# Patient Record
Sex: MALE | Race: OTHER | NOT HISPANIC OR LATINO
[De-identification: names, ages, dates, MRNs, and addresses within clinical notes are randomized per-mention and may not be internally consistent; named-entity substitution may affect disease eponyms.]

---

## 2018-06-08 ENCOUNTER — RESULT REVIEW (OUTPATIENT)
Age: 68
End: 2018-06-08

## 2020-07-23 ENCOUNTER — TRANSCRIPTION ENCOUNTER (OUTPATIENT)
Age: 70
End: 2020-07-23

## 2020-07-24 ENCOUNTER — TRANSCRIPTION ENCOUNTER (OUTPATIENT)
Age: 70
End: 2020-07-24

## 2020-07-24 ENCOUNTER — INPATIENT (INPATIENT)
Facility: HOSPITAL | Age: 70
LOS: 0 days | Discharge: HOME | DRG: 502 | End: 2020-07-24
Attending: ORTHOPAEDIC SURGERY | Admitting: ORTHOPAEDIC SURGERY
Payer: COMMERCIAL

## 2020-07-24 VITALS
TEMPERATURE: 97 F | SYSTOLIC BLOOD PRESSURE: 157 MMHG | HEART RATE: 62 BPM | DIASTOLIC BLOOD PRESSURE: 68 MMHG | OXYGEN SATURATION: 99 % | RESPIRATION RATE: 17 BRPM

## 2020-07-24 VITALS
SYSTOLIC BLOOD PRESSURE: 172 MMHG | OXYGEN SATURATION: 98 % | RESPIRATION RATE: 18 BRPM | HEART RATE: 90 BPM | TEMPERATURE: 98 F | DIASTOLIC BLOOD PRESSURE: 83 MMHG | WEIGHT: 162.92 LBS

## 2020-07-24 DIAGNOSIS — S86.019A STRAIN OF UNSPECIFIED ACHILLES TENDON, INITIAL ENCOUNTER: ICD-10-CM

## 2020-07-24 LAB
ALBUMIN SERPL ELPH-MCNC: 4.6 G/DL — SIGNIFICANT CHANGE UP (ref 3.3–5)
ALP SERPL-CCNC: 48 U/L — SIGNIFICANT CHANGE UP (ref 40–120)
ALT FLD-CCNC: 22 U/L — SIGNIFICANT CHANGE UP (ref 10–45)
ANION GAP SERPL CALC-SCNC: 12 MMOL/L — SIGNIFICANT CHANGE UP (ref 5–17)
APPEARANCE UR: CLEAR — SIGNIFICANT CHANGE UP
APTT BLD: 29.6 SEC — SIGNIFICANT CHANGE UP (ref 27.5–35.5)
AST SERPL-CCNC: 20 U/L — SIGNIFICANT CHANGE UP (ref 10–40)
BASOPHILS # BLD AUTO: 0.06 K/UL — SIGNIFICANT CHANGE UP (ref 0–0.2)
BASOPHILS NFR BLD AUTO: 1 % — SIGNIFICANT CHANGE UP (ref 0–2)
BILIRUB SERPL-MCNC: 0.5 MG/DL — SIGNIFICANT CHANGE UP (ref 0.2–1.2)
BILIRUB UR-MCNC: NEGATIVE — SIGNIFICANT CHANGE UP
BLD GP AB SCN SERPL QL: NEGATIVE — SIGNIFICANT CHANGE UP
BUN SERPL-MCNC: 18 MG/DL — SIGNIFICANT CHANGE UP (ref 7–23)
CALCIUM SERPL-MCNC: 10 MG/DL — SIGNIFICANT CHANGE UP (ref 8.4–10.5)
CHLORIDE SERPL-SCNC: 106 MMOL/L — SIGNIFICANT CHANGE UP (ref 96–108)
CO2 SERPL-SCNC: 27 MMOL/L — SIGNIFICANT CHANGE UP (ref 22–31)
COLOR SPEC: YELLOW — SIGNIFICANT CHANGE UP
CREAT SERPL-MCNC: 1.04 MG/DL — SIGNIFICANT CHANGE UP (ref 0.5–1.3)
DIFF PNL FLD: ABNORMAL
EOSINOPHIL # BLD AUTO: 0.04 K/UL — SIGNIFICANT CHANGE UP (ref 0–0.5)
EOSINOPHIL NFR BLD AUTO: 0.7 % — SIGNIFICANT CHANGE UP (ref 0–6)
GLUCOSE SERPL-MCNC: 105 MG/DL — HIGH (ref 70–99)
GLUCOSE UR QL: NEGATIVE — SIGNIFICANT CHANGE UP
HCT VFR BLD CALC: 46.6 % — SIGNIFICANT CHANGE UP (ref 39–50)
HGB BLD-MCNC: 15.1 G/DL — SIGNIFICANT CHANGE UP (ref 13–17)
IMM GRANULOCYTES NFR BLD AUTO: 0.5 % — SIGNIFICANT CHANGE UP (ref 0–1.5)
INR BLD: 1.03 — SIGNIFICANT CHANGE UP (ref 0.88–1.16)
KETONES UR-MCNC: NEGATIVE — SIGNIFICANT CHANGE UP
LEUKOCYTE ESTERASE UR-ACNC: NEGATIVE — SIGNIFICANT CHANGE UP
LYMPHOCYTES # BLD AUTO: 0.99 K/UL — LOW (ref 1–3.3)
LYMPHOCYTES # BLD AUTO: 16.2 % — SIGNIFICANT CHANGE UP (ref 13–44)
MCHC RBC-ENTMCNC: 28.3 PG — SIGNIFICANT CHANGE UP (ref 27–34)
MCHC RBC-ENTMCNC: 32.4 GM/DL — SIGNIFICANT CHANGE UP (ref 32–36)
MCV RBC AUTO: 87.3 FL — SIGNIFICANT CHANGE UP (ref 80–100)
MONOCYTES # BLD AUTO: 0.37 K/UL — SIGNIFICANT CHANGE UP (ref 0–0.9)
MONOCYTES NFR BLD AUTO: 6.1 % — SIGNIFICANT CHANGE UP (ref 2–14)
NEUTROPHILS # BLD AUTO: 4.61 K/UL — SIGNIFICANT CHANGE UP (ref 1.8–7.4)
NEUTROPHILS NFR BLD AUTO: 75.5 % — SIGNIFICANT CHANGE UP (ref 43–77)
NITRITE UR-MCNC: NEGATIVE — SIGNIFICANT CHANGE UP
NRBC # BLD: 0 /100 WBCS — SIGNIFICANT CHANGE UP (ref 0–0)
PH UR: 5 — SIGNIFICANT CHANGE UP (ref 5–8)
PLATELET # BLD AUTO: 352 K/UL — SIGNIFICANT CHANGE UP (ref 150–400)
POTASSIUM SERPL-MCNC: 4.1 MMOL/L — SIGNIFICANT CHANGE UP (ref 3.5–5.3)
POTASSIUM SERPL-SCNC: 4.1 MMOL/L — SIGNIFICANT CHANGE UP (ref 3.5–5.3)
PROT SERPL-MCNC: 7.5 G/DL — SIGNIFICANT CHANGE UP (ref 6–8.3)
PROT UR-MCNC: NEGATIVE MG/DL — SIGNIFICANT CHANGE UP
PROTHROM AB SERPL-ACNC: 12.3 SEC — SIGNIFICANT CHANGE UP (ref 10.6–13.6)
RBC # BLD: 5.34 M/UL — SIGNIFICANT CHANGE UP (ref 4.2–5.8)
RBC # FLD: 13.7 % — SIGNIFICANT CHANGE UP (ref 10.3–14.5)
RH IG SCN BLD-IMP: POSITIVE — SIGNIFICANT CHANGE UP
SARS-COV-2 RNA SPEC QL NAA+PROBE: SIGNIFICANT CHANGE UP
SODIUM SERPL-SCNC: 145 MMOL/L — SIGNIFICANT CHANGE UP (ref 135–145)
SP GR SPEC: 1.02 — SIGNIFICANT CHANGE UP (ref 1–1.03)
UROBILINOGEN FLD QL: 0.2 E.U./DL — SIGNIFICANT CHANGE UP
WBC # BLD: 6.1 K/UL — SIGNIFICANT CHANGE UP (ref 3.8–10.5)
WBC # FLD AUTO: 6.1 K/UL — SIGNIFICANT CHANGE UP (ref 3.8–10.5)

## 2020-07-24 PROCEDURE — 93010 ELECTROCARDIOGRAM REPORT: CPT

## 2020-07-24 PROCEDURE — 99222 1ST HOSP IP/OBS MODERATE 55: CPT

## 2020-07-24 PROCEDURE — 99285 EMERGENCY DEPT VISIT HI MDM: CPT | Mod: CS

## 2020-07-24 PROCEDURE — 71046 X-RAY EXAM CHEST 2 VIEWS: CPT | Mod: 26

## 2020-07-24 RX ORDER — OXYCODONE HYDROCHLORIDE 5 MG/1
10 TABLET ORAL EVERY 4 HOURS
Refills: 0 | Status: DISCONTINUED | OUTPATIENT
Start: 2020-07-24 | End: 2020-07-24

## 2020-07-24 RX ORDER — HYDROMORPHONE HYDROCHLORIDE 2 MG/ML
0.5 INJECTION INTRAMUSCULAR; INTRAVENOUS; SUBCUTANEOUS
Refills: 0 | Status: DISCONTINUED | OUTPATIENT
Start: 2020-07-24 | End: 2020-07-24

## 2020-07-24 RX ORDER — HYDROMORPHONE HYDROCHLORIDE 2 MG/ML
0.5 INJECTION INTRAMUSCULAR; INTRAVENOUS; SUBCUTANEOUS EVERY 4 HOURS
Refills: 0 | Status: DISCONTINUED | OUTPATIENT
Start: 2020-07-24 | End: 2020-07-24

## 2020-07-24 RX ORDER — ASPIRIN/CALCIUM CARB/MAGNESIUM 324 MG
1 TABLET ORAL
Qty: 30 | Refills: 0
Start: 2020-07-24 | End: 2020-08-22

## 2020-07-24 RX ORDER — ACETAMINOPHEN 500 MG
650 TABLET ORAL EVERY 6 HOURS
Refills: 0 | Status: DISCONTINUED | OUTPATIENT
Start: 2020-07-24 | End: 2020-07-24

## 2020-07-24 RX ORDER — SODIUM CHLORIDE 9 MG/ML
1000 INJECTION, SOLUTION INTRAVENOUS
Refills: 0 | Status: DISCONTINUED | OUTPATIENT
Start: 2020-07-24 | End: 2020-07-24

## 2020-07-24 RX ORDER — OXYCODONE AND ACETAMINOPHEN 5; 325 MG/1; MG/1
1 TABLET ORAL EVERY 4 HOURS
Refills: 0 | Status: DISCONTINUED | OUTPATIENT
Start: 2020-07-24 | End: 2020-07-24

## 2020-07-24 RX ORDER — OXYCODONE HYDROCHLORIDE 5 MG/1
5 TABLET ORAL EVERY 4 HOURS
Refills: 0 | Status: DISCONTINUED | OUTPATIENT
Start: 2020-07-24 | End: 2020-07-24

## 2020-07-24 RX ADMIN — OXYCODONE AND ACETAMINOPHEN 1 TABLET(S): 5; 325 TABLET ORAL at 20:35

## 2020-07-24 RX ADMIN — OXYCODONE HYDROCHLORIDE 10 MILLIGRAM(S): 5 TABLET ORAL at 20:55

## 2020-07-24 RX ADMIN — OXYCODONE AND ACETAMINOPHEN 1 TABLET(S): 5; 325 TABLET ORAL at 20:20

## 2020-07-24 RX ADMIN — OXYCODONE HYDROCHLORIDE 10 MILLIGRAM(S): 5 TABLET ORAL at 20:40

## 2020-07-24 RX ADMIN — HYDROMORPHONE HYDROCHLORIDE 0.5 MILLIGRAM(S): 2 INJECTION INTRAMUSCULAR; INTRAVENOUS; SUBCUTANEOUS at 20:29

## 2020-07-24 RX ADMIN — SODIUM CHLORIDE 100 MILLILITER(S): 9 INJECTION, SOLUTION INTRAVENOUS at 16:16

## 2020-07-24 RX ADMIN — HYDROMORPHONE HYDROCHLORIDE 0.5 MILLIGRAM(S): 2 INJECTION INTRAMUSCULAR; INTRAVENOUS; SUBCUTANEOUS at 20:00

## 2020-07-24 NOTE — ED PROVIDER NOTE - CLINICAL SUMMARY MEDICAL DECISION MAKING FREE TEXT BOX
68 y/o m presents for admission, pre op after being diagnosed with right achilles tendon rupture; vss in ED, pt comfortable, will order pre op labs, covid test, ortho evaluated in ED and will admit for surgery

## 2020-07-24 NOTE — H&P ADULT - PROBLEM SELECTOR PLAN 1
Admit to Orthopaedic Service Dr. Esquivel  Added on for OR today for right achilles tendon rupture   Medical optimization for OR per Dr. Barker   Preop labs/EKG/CXR/COVID PCR ordered and pending  NPO/IVF

## 2020-07-24 NOTE — ED PROVIDER NOTE - ATTENDING CONTRIBUTION TO CARE
known achilles rupture, referred by ortho for operative repair.  vitals stable, well appearing, foot in ortho boot

## 2020-07-24 NOTE — DISCHARGE NOTE PROVIDER - NSDCFUADDINST_GEN_ALL_CORE_FT
No strenuous activity, heavy lifting, driving, tub bathing, or returning to work until cleared by MD.  KEEP RLE DRY. SPLINT CARE.  NWB RLE with CRUTCHES.  Follow up with your doctor to schedule an appointment within 10-14 days.  If you don't have a bowel movement by post op day 3, then take Milk of Magnesia (over the counter).  If no bowel movement by at least post op day 5, then use a Dulcolax suppository (over the counter) and/or a Fleets enema--if still no bowel movement, call your MD.  Contact your doctor if you experience: fever greater than 101.5, chills, chest pain, difficulty breathing, bleeding, redness or heat around the incision.

## 2020-07-24 NOTE — DISCHARGE NOTE PROVIDER - HOSPITAL COURSE
Admitted    Surgery -- R achulles tendon repair 7/24    Noemi-op Antibiotics    Pain control    DVT prophylaxis    OOB/Physical Therapy

## 2020-07-24 NOTE — DISCHARGE NOTE PROVIDER - NSDCCPCAREPLAN_GEN_ALL_CORE_FT
PRINCIPAL DISCHARGE DIAGNOSIS  Diagnosis: Achilles tendon rupture  Assessment and Plan of Treatment:

## 2020-07-24 NOTE — ED ADULT NURSE NOTE - OBJECTIVE STATEMENT
Presents to ED for possible surgery on ruptured achillis tendon.  Pt reports that he injured his right foot 10 days ago while playing tennies.  He was unable to walk properly after the injury.  Reports pain when ambulating.  PT has cam boot to right foot in place on arrival to ED.  denies numbness, tingling, fever, chills, cp, sob.

## 2020-07-24 NOTE — H&P ADULT - NSHPPHYSICALEXAM_GEN_ALL_CORE
Gen: NAD, sitting comfortably in exam chair in the ED, speaking full sentences, pleasant  MSK: Right lower extremity in CAM boot. Exam deferred secondary to lower extremity in boot. Skin well perfused. Moving all four extremities well.

## 2020-07-24 NOTE — DISCHARGE NOTE PROVIDER - NSDCMRMEDTOKEN_GEN_ALL_CORE_FT
Aspirin Enteric Coated 325 mg oral delayed release tablet: 1 tab(s) orally once a day   oxycodone-acetaminophen 5 mg-325 mg oral tablet: 1 tab(s) orally every 6 hours, As Needed -for severe pain MDD:6

## 2020-07-24 NOTE — H&P ADULT - HISTORY OF PRESENT ILLNESS
The patient is a 69 year old male sent to the ED by Dr. Esquivel for right achilles tendon rupture. The patient states that he was playing tennis on 7/11/20 and states that all of a sudden it felt like his right foot/ankle was "hit from behind." He states that he was still able to bear weight after feeling this pain; he states that he has been ambulating but with a limp. He states that he noticed some swelling to his right ankle/foot but denies open wounds, deformity. He states that he sent a picture of his swollen leg to his internist Dr. Barker who advised him to see Dr. Esquivel for evaluation. Dr. Esquivel subsequently sent the patient to the ER for further evaluation of right achilles tendon rupture. States he last ate at 8:30 AM this morning. Denies numbness/tingling of his bilateral lower extremities, pain in any other joints. The patient is a 69 year old male sent to the ED by Dr. Esquivel for right achilles tendon rupture. The patient states that he was playing tennis on 7/11/20 and states that all of a sudden it felt like his right foot/ankle was "hit from behind" but denies direct trauma or fall. He states that he was still able to bear weight after feeling this pain; he states that he has been ambulating but with a limp. He states that he noticed some swelling to his right ankle/foot but denies open wounds, deformity. He states that he sent a picture of his swollen leg to his internist Dr. Barker who advised him to see Dr. Esquivel for evaluation. Dr. Esquivel subsequently sent the patient to the ER for further evaluation of right achilles tendon rupture. States he last ate at 8:30 AM this morning. States he does not currently take any blood thinning medications. Denies history of DVT, numbness/tingling of his bilateral lower extremities, pain in any other joints.

## 2020-07-24 NOTE — ED PROVIDER NOTE - OBJECTIVE STATEMENT
70 y/o m no pmh presents c/o pain to right heel for the past week after injury while playing tennis.  Pt reports has been partial weight bearing all week, has swelling and bruising to ankle.  Pt saw orthopedist Dr. Esquivel this morning who diagnosed him with torn achilles tendon and sent to ED to be admitted for surgery.  Pt denies numbness/tingling to ext, all other ROS negative.

## 2020-07-24 NOTE — ED CLERICAL - NS ED CLERK NOTE PRE-ARRIVAL INFORMATION; ADDITIONAL PRE-ARRIVAL INFORMATION
pt was sent in for achilles rupture, admit to OR for surgery Today. Pt of DR. Barker and DR. Clinton.

## 2020-07-24 NOTE — DISCHARGE NOTE PROVIDER - CARE PROVIDER_API CALL
Bandar Esquivel  ORTHOPAEDIC SURGERY  [address] [place] [address]  Phone: [phone]  Fax: [phone]  Follow Up Time:

## 2020-07-24 NOTE — PACU DISCHARGE NOTE - COMMENTS
pt verbalized understanding all discharged isntrcutions  iv discontinuo  pt left accompanied by friends

## 2020-07-25 PROCEDURE — 99282 EMERGENCY DEPT VISIT SF MDM: CPT
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
## 2020-07-29 DIAGNOSIS — S86.011A STRAIN OF RIGHT ACHILLES TENDON, INITIAL ENCOUNTER: ICD-10-CM
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
## 2020-07-29 PROCEDURE — 85025 COMPLETE CBC W/AUTO DIFF WBC: CPT

## 2020-07-29 PROCEDURE — 81001 URINALYSIS AUTO W/SCOPE: CPT

## 2020-07-29 PROCEDURE — 64708 REVISE ARM/LEG NERVE: CPT

## 2020-07-29 PROCEDURE — 99285 EMERGENCY DEPT VISIT HI MDM: CPT | Mod: 25

## 2020-07-29 PROCEDURE — 93005 ELECTROCARDIOGRAM TRACING: CPT

## 2020-07-29 PROCEDURE — 71046 X-RAY EXAM CHEST 2 VIEWS: CPT

## 2020-07-29 PROCEDURE — 85730 THROMBOPLASTIN TIME PARTIAL: CPT

## 2020-07-29 PROCEDURE — 36415 COLL VENOUS BLD VENIPUNCTURE: CPT

## 2020-07-29 PROCEDURE — C1713: CPT

## 2020-07-29 PROCEDURE — 85610 PROTHROMBIN TIME: CPT

## 2020-07-29 PROCEDURE — 80053 COMPREHEN METABOLIC PANEL: CPT

## 2020-07-29 PROCEDURE — 87635 SARS-COV-2 COVID-19 AMP PRB: CPT

## 2020-07-29 PROCEDURE — 86901 BLOOD TYPING SEROLOGIC RH(D): CPT

## 2020-07-29 PROCEDURE — 27650 REPAIR ACHILLES TENDON: CPT

## 2020-07-29 PROCEDURE — 86850 RBC ANTIBODY SCREEN: CPT

## 2020-10-22 ENCOUNTER — APPOINTMENT (OUTPATIENT)
Dept: VASCULAR SURGERY | Facility: CLINIC | Age: 70
End: 2020-10-22
Payer: MEDICARE

## 2020-10-22 DIAGNOSIS — Z87.891 PERSONAL HISTORY OF NICOTINE DEPENDENCE: ICD-10-CM

## 2020-10-22 DIAGNOSIS — I82.561 RT CALF MUSCULAR VEIN CHRONIC EMBOLISM AND THROMBOSIS: ICD-10-CM

## 2020-10-22 DIAGNOSIS — N40.0 BENIGN PROSTATIC HYPERPLASIA WITHOUT LOWER URINARY TRACT SYMPMS: ICD-10-CM

## 2020-10-22 DIAGNOSIS — Z78.9 OTHER SPECIFIED HEALTH STATUS: ICD-10-CM

## 2020-10-22 PROBLEM — Z00.00 ENCOUNTER FOR PREVENTIVE HEALTH EXAMINATION: Status: ACTIVE | Noted: 2020-10-22

## 2020-10-22 PROCEDURE — 93971 EXTREMITY STUDY: CPT

## 2020-10-22 PROCEDURE — 99204 OFFICE O/P NEW MOD 45 MIN: CPT

## 2020-10-22 NOTE — REVIEW OF SYSTEMS
[As noted in HPI] : as noted in HPI [Lower Ext Edema] : lower extremity edema [Negative] : Heme/Lymph

## 2020-11-04 NOTE — HISTORY OF PRESENT ILLNESS
[FreeTextEntry1] : 69yoM w/h/o BPH s/p TURP/bladder calculi s/p lithotripsy, recently s/p R achilles tendon rupture repair in 07/2020 by Dr. Esquivel, placed in a fixed-ankle boot up through last week.  Pt now ambulating w/o difficulty, but drove 2h 5d prior and noted R calf/foot swelling the following morning; pt elevated his leg and placed a compression sock on the leg which reduced the swelling.  Leg is still slightly red, no pain reported, and pt denies SOB/chest pain.\par \par Pt previously on ASA qd until June 2020 when he developed dental bleeding, has not restarted ASA since that time.  He denies any previous h/o DVT/SVT.

## 2020-11-04 NOTE — ASSESSMENT
[FreeTextEntry1] : 69yoM w/h/o BPH s/p TURP/bladder calculi s/p lithotripsy, recently s/p R achilles tendon rupture repair in 07/2020 by Dr. Esquivel, placed in a fixed-ankle boot up through last week.  Pt now ambulating w/o difficulty, but drove 2h 5d prior and noted R calf/foot swelling the following morning; pt elevated his leg and placed a compression sock on the leg which reduced the swelling.  Leg is still slightly red, no pain reported, and pt denies SOB/chest pain.  Pt previously on ASA qd until June 2020 when he developed dental bleeding, has not restarted ASA since that time.  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  Recommend pt restart ASA at 325mg PO qd and return to office in 2wks for repeat venous duplex.  Encouraged regular exercise and adequate daily hydration. [Arterial/Venous Disease] : arterial/venous disease

## 2020-11-04 NOTE — PROCEDURE
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

## 2020-11-04 NOTE — PHYSICAL EXAM
[Normal Thyroid] : the thyroid was normal [Normal Breath Sounds] : Normal breath sounds [Normal Heart Sounds] : normal heart sounds [Normal Rate and Rhythm] : normal rate and rhythm [2+] : left 2+ [Ankle Swelling (On Exam)] : present [Ankle Swelling On The Right] : mild [Alert] : alert [Calm] : calm [JVD] : no jugular venous distention  [Varicose Veins Of Lower Extremities] : not present [] : not present [Abdomen Masses] : No abdominal masses [Abdomen Tenderness] : ~T ~M No abdominal tenderness [Purpura] : no purpura  [Petechiae] : no petechiae [Skin Ulcer] : no ulcer [Skin Induration] : no induration [de-identified] : Well-nourished, NAD [de-identified] : NC/AT, anicteric [de-identified] : FROM throughout, strength 5/5x4, no palpable cords in LEs b/l [de-identified] : Hyperemic skin of the R lower leg noted, no erythema [de-identified] : Neurosensory grossly intact in RLE

## 2020-11-06 ENCOUNTER — APPOINTMENT (OUTPATIENT)
Dept: VASCULAR SURGERY | Facility: CLINIC | Age: 70
End: 2020-11-06
Payer: MEDICARE

## 2020-11-06 PROCEDURE — 99213 OFFICE O/P EST LOW 20 MIN: CPT

## 2020-11-06 PROCEDURE — 93971 EXTREMITY STUDY: CPT

## 2020-11-09 NOTE — ADDENDUM
[FreeTextEntry1] : This note was written by Heena Davidson on 11/06/2020 acting as scribe for Earl Ash M.D.\par \par I, Earl Swan have read and attest that all the information, medical decision making and discharge instructions within are true and accurate.

## 2020-11-09 NOTE — ASSESSMENT
[FreeTextEntry1] : 70 y/o M w/ RLE swelling with chronic  R muscular vein peroneal vein thrombus, no DVT/SVT on ASA. On exam, palpable PT, DP bilaterally. Discussed findings on venous doppler with no progression of the thrombus or acute findings. Pt to continue ASA for the next 6 months, he is cleared to drive but was advised to stop every hour and walk around. Patient recommended to stay active as much as possible, walk daily and stay well hydrated. To f/u with us here in 6 months. Patient to contact the office in case he develops any concerning symptoms.  [Arterial/Venous Disease] : arterial/venous disease

## 2020-11-09 NOTE — PROCEDURE
[FreeTextEntry1] : RLE venous doppler done at the office today:  Chronic thrombus peroneal vein with mild chronic changes in soleal.

## 2020-11-09 NOTE — HISTORY OF PRESENT ILLNESS
[FreeTextEntry1] : 70 y/o M w/h/o BPH s/p TURP/bladder calculi s/p lithotripsy, recently s/p R Achilles tendon rupture repair in 07/2020 by Dr. Esquivel. Presents for 2 week f/u evaluation. He has been feeling well overall.  Pt has been elevating his leg , swelling controlled. He has been wearing compression socks with improvement. Pt started on ASA (325 mg) form previous visit and reports he has been compliant with this. Patient would like to know if he is cleared to drive and has an upcoming trip to [place] next week.  \par  Denies SOB/chest pain, denies any previous h/o DVT/SVT.

## 2020-11-09 NOTE — PHYSICAL EXAM
[Normal Thyroid] : the thyroid was normal [Normal Breath Sounds] : Normal breath sounds [Normal Heart Sounds] : normal heart sounds [Normal Rate and Rhythm] : normal rate and rhythm [2+] : left 2+ [Ankle Swelling (On Exam)] : present [Ankle Swelling On The Right] : mild [Alert] : alert [Oriented to Person] : oriented to person [Oriented to Place] : oriented to place [Oriented to Time] : oriented to time [Calm] : calm [JVD] : no jugular venous distention  [Varicose Veins Of Lower Extremities] : not present [] : not present [Abdomen Masses] : No abdominal masses [Abdomen Tenderness] : ~T ~M No abdominal tenderness [Purpura] : no purpura  [Petechiae] : no petechiae [Skin Ulcer] : no ulcer [Skin Induration] : no induration [de-identified] : Well-nourished, NAD [de-identified] : NC/AT, anicteric [de-identified] : Supple [de-identified] : FROM throughout, strength 5/5x4, no palpable cords in LEs b/l [de-identified] : Neurosensory grossly intact

## 2021-06-25 ENCOUNTER — APPOINTMENT (OUTPATIENT)
Dept: VASCULAR SURGERY | Facility: CLINIC | Age: 71
End: 2021-06-25
Payer: MEDICARE

## 2021-06-25 PROCEDURE — 99213 OFFICE O/P EST LOW 20 MIN: CPT

## 2021-06-25 PROCEDURE — 93971 EXTREMITY STUDY: CPT

## 2021-06-29 NOTE — PHYSICAL EXAM
[Respiratory Effort] : normal respiratory effort [Normal Rate and Rhythm] : normal rate and rhythm [Alert] : alert [Oriented to Place] : oriented to place [Oriented to Time] : oriented to time [Calm] : calm [Ankle Swelling (On Exam)] : not present [Varicose Veins Of Lower Extremities] : not present [] : not present [Abdomen Tenderness] : ~T ~M No abdominal tenderness [de-identified] : Friendly, Cooperative  [de-identified] : NC/AT  [de-identified] : Supple  [de-identified] : FROM

## 2021-06-29 NOTE — HISTORY OF PRESENT ILLNESS
[FreeTextEntry1] : 71 y/o M with s/p R Achilles tendon rupture repair in 07/2020 by Dr. Esquivel, placed in a fixed - ankle boot and  subsequently developed RLE swelling and found to have chronic thrombus noted in one of the peroneal veins and mild chronic changes noted in the soleal vein started on ASA (325 mg 10/22/2020) presents for routine follow up on RLE chronic thrombus. Pt feeling well overall. He has been active and walking daily without reports of LE swelling, pain, skin discoloration changes, open sores/ulcers. He has been compliant taking ASA daily.

## 2021-06-29 NOTE — ADDENDUM
[FreeTextEntry1] : This note was written by Heena Davidson on 06/25/2021 acting as scribe for Earl Ash M.D.\par \par I, Dr. Earl Pedro, personally performed the evaluation and management (E/M) services for this established patient who presents today with (an) existing condition(s).  That E/M includes conducting the examination, assessing all conditions, and (re)establishing/reinforcing a plan of care.  Today, my ACP, Lidya SCHAFER, was here to observe my evaluation and management services for this condition to be followed going forward.\par \par \par \par \par

## 2021-06-29 NOTE — ASSESSMENT
[Arterial/Venous Disease] : arterial/venous disease [Medication Management] : medication management [FreeTextEntry1] : 71 y/o M w/R Achilles tendon rupture s/p repair by Dr. Esquivel on 7/2020 found to have RLE muscular vein/peroneal vein thrombi on  mg presents for routine follow up. Venous duplex of the RLE today reveals complete resolution of muscular/peroneal vein thrombi. Pt advise to stop ASA. He is encouraged to stay active through daily exercising and walking. Keep well hydrated. No vascular surgery interventions indicated at this time. Pt may continue to f/u here as needed.

## 2024-04-08 NOTE — ED ADULT NURSE NOTE - NS ED NURSE LEVEL OF CONSCIOUSNESS SPEECH
Patient given dental balls, went over instructions.    Lidocane viscous   Ndc: 05137-591-45, Lot: [id number], exp: 07/2025    Cetacaine  Nd: [address]-9, Lot: [id number], exp: 03/2025   Speaking Coherently